# Patient Record
Sex: MALE | Race: WHITE | URBAN - METROPOLITAN AREA
[De-identification: names, ages, dates, MRNs, and addresses within clinical notes are randomized per-mention and may not be internally consistent; named-entity substitution may affect disease eponyms.]

---

## 2017-01-01 ENCOUNTER — OFFICE VISIT (OUTPATIENT)
Dept: PHYSICAL MEDICINE AND REHAB | Facility: CLINIC | Age: 82
End: 2017-01-01

## 2017-01-01 VITALS — SYSTOLIC BLOOD PRESSURE: 156 MMHG | HEIGHT: 66 IN | HEART RATE: 65 BPM | DIASTOLIC BLOOD PRESSURE: 74 MMHG

## 2017-01-01 VITALS — HEART RATE: 75 BPM | HEIGHT: 66 IN | SYSTOLIC BLOOD PRESSURE: 141 MMHG | DIASTOLIC BLOOD PRESSURE: 72 MMHG

## 2017-01-01 DIAGNOSIS — G81.10 SPASTIC HEMIPARESIS AFFECTING DOMINANT SIDE (H): Primary | ICD-10-CM

## 2017-01-01 DIAGNOSIS — G81.11 RIGHT SPASTIC HEMIPARESIS (H): Primary | ICD-10-CM

## 2017-01-01 ASSESSMENT — PAIN SCALES - GENERAL: PAINLEVEL: MODERATE PAIN (5)

## 2017-01-24 NOTE — Clinical Note
1/24/2017       RE: Melody Mckinney  [address]  [facility]  [facility] [address]     Dear Colleague,    Thank you for referring your patient, Melody Mckinney, to the [facility] PHYSICAL MEDICINE AND REHABILITATION at [facility]. Please see a copy of my visit note below.    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    He returns to the clinic today for reinjection of Botox for severe spasticity involving the right UE/LE.   Denies any recent falls.   He is accompanied by his daughter Samra and his daughter.  He denies any pain, but reports increasing tightness.     [place] to be a [place], dependent for his mobility and adl's following a new stroke in Feb 2015 leading to cognitive deficits and aphasia.  He is wheel chair bound.   He continues to be dependent for transfers, using easy stand. He is total assist for adl's.   He stands occasionally perhaps twice a week.   He is incontinent for bowel and bladder, improving with a established bowel program  Appetite is good.      Denies any hospitalizations, or any falls except the incident noted above.        PHYSICAL EXAM  There were no vitals taken for this visit.  Melody is sitting in a wheel; chair, awake and alert.   Speech is slurred, comprehension is impaired   Able to answer yes no questions.   Seated posture is much improved compared to last encounters.   Able to follow directions most of the time  Face is symmetrical   EOM good   He has 0-1/5 strength in the RUE    Antigravity movement in the RLE graded at 3-/5.     Tone   RUE  Pectoralis  2/4  Triceps graded at 4/4 MAS   Biceps 1/4  FDS/FDP 3/4  FCR /FCU are 3/4   RLE   Hip flexion 2/4  Quads are 2/4  Sartorius is 3/4,   Hamstrings are 3/4     RESPONSE TO PREVIOUS  TREATMENT:    Patient Melody Mckinney received 500 units of Botox on 10/25/16.    Problems following the previous series of neurotoxin injections included:  No problems reported    Benefits by Patient Report:    Pain Improvement:  Yes  % Pain Improvement 75 %  Duration of Benefit:  8 weeks.    Spasticity Improvement:  Yes  % Dystonia Improvement 80 %  Duration of Benefit:  9 weeks.    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    His baseline symptoms have been recalcitrant to oral medications and conservative therapy.  He is here today for reinjection with Botox.    GOAL OF PROCEDURE:  The goal of this procedure is to improve increase active range of motion, improve volitional motor control, decrease pain  and enhance functional independence associated with spasticity.    TOTAL DOSE ADMINISTERED:  Dose Administered:  400 units Botox (Botulinum Toxin Type A)       1:1 Dilution     Diluent Used:  Preservative Free Normal Saline  Total Volume of Diluent Used:  4 ml  Lot #  [id number] with Expiration Date:  2019    CONSENT:  The risks, benefits, and treatment options were discussed with Melody Mckinney and he agreed to proceed.    EQUIPMENT USED:  Needle-37mm stimulating/recording  EMG/NCS Machine    SKIN PREPARATION:  Skin preparation was performed using an alcohol wipe.      GUIDANCE DESCRIPTION:  Electro-myographic guidance was necessary throughout the procedure to accurately identify all areas of spastic muscles while  avoiding injection of non-spastic muscles, neighboring nerves and nearby vascular structures.     AREA/MUSCLE INJECTED:  Total dose of the Botox was increased today to 400 units due to increased tone noted in the LE and pectoralis muscles/finger flexors        units  Semimembranous 25 units  Semitendinosus 25 units  Biceps fem 25 units   Iliopsoas 40 units  Sartorious 25 units  Quads x 2 sites of 20 units to a total of 40 units      units  Pects 30 units   Triceps 40 units   FCR/FCU 35/35 units= 70  FDS/FDP 20/20 units= 40  Lumbricals 3 x 10 =  30 units    RESPONSE TO PROCEDURE:  Melody Mckinney tolerated the procedure well and there were no immediate complications.   He was allowed to recover for an appropriate period of time and was discharged home in stable condition.     Follow Up  Melody Mckinney was asked to follow up by phone in 7-14 days with Samra Bridges RN, and Care Coordinator, to report his response to this series of injections.  Based on the patient's previous response to this therapy, Melody Mckinney was rescheduled for the next series of injections in 11 weeks.    Today, the total dose of the Botox was reduced, and focused on the right hand due to hand tightness.     Again, thank you for allowing me to participate in the care of your patient.      Sincerely,    Ellyn Bosch MD

## 2017-01-24 NOTE — PROGRESS NOTES
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    He returns to the clinic today for reinjection of Botox for severe spasticity involving the right UE/LE.   Denies any recent falls.   He is accompanied by his daughter Samra and his daughter.  He denies any pain, but reports increasing tightness.     Melody continues to be a [place], dependent for his mobility and adl's following a new stroke in Feb 2015 leading to cognitive deficits and aphasia.  He is wheel chair bound.   He continues to be dependent for transfers, using easy stand. He is total assist for adl's.   He stands occasionally perhaps twice a week.   He is incontinent for bowel and bladder, improving with a established bowel program  Appetite is good.      Denies any hospitalizations, or any falls except the incident noted above.        PHYSICAL EXAM  There were no vitals taken for this visit.  Melody is sitting in a wheel; chair, awake and alert.   Speech is slurred, comprehension is impaired   Able to answer yes no questions.   Seated posture is much improved compared to last encounters.   Able to follow directions most of the time  Face is symmetrical   EOM good   He has 0-1/5 strength in the RUE    Antigravity movement in the RLE graded at 3-/5.     Tone   RUE  Pectoralis  2/4  Triceps graded at 4/4 MAS   Biceps 1/4  FDS/FDP 3/4  FCR /FCU are 3/4   RLE   Hip flexion 2/4  Quads are 2/4  Sartorius is 3/4,   Hamstrings are 3/4     RESPONSE TO PREVIOUS TREATMENT:    Patient Melody Mckinney received 500 units of Botox on 10/25/16.    Problems following the previous series of neurotoxin injections included:  No problems reported    Benefits by Patient Report:    Pain Improvement:  Yes  % Pain Improvement 75 %  Duration of Benefit:  8 weeks.    Spasticity Improvement:  Yes  % Dystonia Improvement 80  %  Duration of Benefit:  9 weeks.    BOTULINUM NEUROTOXIN INJECTION PROCEDURES:    VERIFICATION OF PATIENT IDENTIFICATION  Responsible Person:  ABDIRASHID  : verified  Full Name: verified    INDICATIONS FOR PROCEDURES:  Melody Mckinney is a 87 year old patient with spasticity affecting the right upper extremity and right lower extremity secondary to a diagnosis of Dominant spastic Stroke with associated pain, spasms, loss of joint motion, loss of volitional motor control, hygiene difficulty, difficulty with activities of daily living and difficulty with ambulation and transfers.    His baseline symptoms have been recalcitrant to oral medications and conservative therapy.  He is here today for reinjection with Botox.    GOAL OF PROCEDURE:  The goal of this procedure is to improve increase active range of motion, improve volitional motor control, decrease pain  and enhance functional independence associated with spasticity.    TOTAL DOSE ADMINISTERED:  Dose Administered:  400 units Botox (Botulinum Toxin Type A)       1:1 Dilution     Diluent Used:  Preservative Free Normal Saline  Total Volume of Diluent Used:  4 ml  Lot #  [id number] with Expiration Date:  2019    CONSENT:  The risks, benefits, and treatment options were discussed with Melody Mckinney and he agreed to proceed.    EQUIPMENT USED:  Needle-37mm stimulating/recording  EMG/NCS Machine    SKIN PREPARATION:  Skin preparation was performed using an alcohol wipe.      GUIDANCE DESCRIPTION:  Electro-myographic guidance was necessary throughout the procedure to accurately identify all areas of spastic muscles while avoiding injection of non-spastic muscles, neighboring nerves and nearby vascular structures.     AREA/MUSCLE INJECTED:  Total dose of the Botox was increased today to 400 units due to increased tone noted in the LE and pectoralis muscles/finger flexors        units  Semimembranous 25 units  Semitendinosus 25 units  Biceps fem 25 units    Iliopsoas 40 units  Sartorious 25 units  Quads x 2 sites of 20 units to a total of 40 units      units  Pects 30 units   Triceps 40 units   FCR/FCU 35/35 units= 70  FDS/FDP 20/20 units= 40  Lumbricals 3 x 10 =  30 units    RESPONSE TO PROCEDURE:  Melody Mckinney tolerated the procedure well and there were no immediate complications.   He was allowed to recover for an appropriate period of time and was discharged home in stable condition.     Follow Up  Melody Mckinney was asked to follow up by phone in 7-14 days with Samra Bridges RN, and Care Coordinator, to report his response to this series of injections.  Based on the patient's previous response to this therapy, Melody Mckinney was rescheduled for the next series of injections in 11 weeks.    Today, the total dose of the Botox was reduced, and focused on the right hand due to hand tightness.

## 2017-04-25 NOTE — MR AVS SNAPSHOT
After Visit Summary   2017    Melody Mckinney    MRN: [id number]           Patient Information     Date Of Birth          1928        Visit Information        Provider Department      2017 1:20 PM Ellyn Bosch MD [facility] Physical Medicine and Rehabilitation        Today's Diagnoses     Right spastic hemiparesis (H)    -  1       Follow-ups after your visit        Follow-up notes from your care team     Return in about 3 months (around 2017).      Your next 10 appointments already scheduled     2017  3:20 PM CDT   (Arrive by 3:05 PM)   Return Visit with Ellyn Bosch MD   [facility] Physical Medicine and Rehabilitation ([facility])    [address] [place] [id number] [phone]              Who to contact     Please call your clinic at [phone] to:    Ask questions about your health    Make or cancel appointments    Discuss your medicines    Learn about your test results    Speak to your doctor   If you have compliments or concerns about an experience at your clinic, or if you wish to file a complaint, please contact [facility] Patient Relations at [phone] or email us at [email or web address]         Additional Information About Your Visit        MyChart Information     [employer] is an electronic gateway that provides easy, online access to your medical records. With [employer], you can request a clinic appointment, read your test results, renew a prescription or communicate with your care team.     To sign up for [employer] visit the website at [email or web address]   You will be asked to enter the access code listed below, as well as some personal information. Please follow the directions to create your username and password.     Your access code is: [email or web address]  Expires: 7/10/2017  6:30 AM     Your access code will  in 90 days. If you need help  "or a new code, please contact your [facility] or call [phone] for assistance.        Care EveryWhere ID     This is your Care EveryWhere ID. This could be used by other organizations to access your [facility] medical records  [id number]        Your Vitals Were     Pulse Height                65 5' 6\" (1.676 m)           Blood Pressure from Last 3 Encounters:   04/25/17 156/74   01/24/17 141/72   10/25/16 (!) 171/103    Weight from Last 3 Encounters:   04/26/16 133 lb (60.3 kg)   08/15/13 135 lb (61.2 kg)   07/19/13 140 lb 8 oz (63.7 kg)              We Performed the Following     H NEEDLE BOTOX     HC CHEMODENERVATION 1 EXTREMITY, 5 OR MORE MUSCLES     HC CHEMODENERVATION 1 EXTREMITY, EA ADDL EXTREMITY, 5 OR MORE MUSCLES     Needle EMG Guide w/Chemodenervation ([id number])        Primary Care Provider Office Phone # Fax #    David Martines [phone]       [address] [address]        Thank you!     Thank you for choosing [facility] PHYSICAL MEDICINE AND REHABILITATION  for your care. Our goal is always to provide you with excellent care. Hearing back from our patients is one way we can continue to improve our services. Please take a few minutes to complete the written survey that you may receive in the mail after your visit with us. Thank you!             Your Updated Medication List - Protect others around you: Learn how to safely use, store and throw away your medicines at www.disposemymeds.org.          This list is accurate as of: 4/25/17  2:37 PM.  Always use your most recent med list.                   Brand Name Dispense Instructions for use    aspirin 81 MG tablet      Take 1 tablet by mouth daily.       baclofen 10 MG tablet    LIORESAL    90 tablet    Take 0.75 tablets (7.5 mg) by mouth 3 times daily       BOTOX IJ      Inject 100 Units as directed /c2; 11/2016       clopidogrel 75 MG tablet    PLAVIX "    90 tablet    Take 1 tablet (75 mg) by mouth daily To reduce risk of stroke       diazepam 5 MG tablet    VALIUM    20 tablet    Take 0.5 tablets (2.5 mg) by mouth every 4 hours as needed for anxiety or sleep       ENSURE Liqd      Take 1 Can by mouth daily.       gabapentin 100 MG capsule    NEURONTIN    90 capsule    Take 1 capsule (100 mg) by mouth 3 times daily       hydrochlorothiazide 25 MG tablet    HYDRODIURIL    90 tablet    Take 1 tablet (25 mg) by mouth daily For hypertension       lisinopril 5 MG tablet    PRINIVIL/ZESTRIL    90 tablet    Take 1 tab daily       metoprolol 50 MG 24 hr tablet    TOPROL-XL    90 tablet    Take 1 pill every morning for heart and blood pressure       nitroglycerin 0.4 MG sublingual tablet    NITROSTAT    25 tablet    Place 1 tablet (0.4 mg) under the tongue every 5 minutes as needed for chest pain If you are still having symptoms after 3 doses (15 minutes) call 911.       nystatin [id number] UNIT/GM Powd    MYCOSTATIN    60 g    To groin BID       order for DME     1 Units    Equipment being ordered: Wheelchair- light weight manual type. Diagnoses are stroke, ischemic heart disease, status post right hip fracture and repair, and gait abnormality.       oxyCODONE 2.5 mg Tabs IR half-tab    ROXICODONE     Take 5 mg by mouth every 4 hours as needed.       sennosides 8.6 MG tablet    SENOKOT    120 tablet    Take 2 tablets by mouth daily as needed for constipation       tiZANidine 2 MG tablet    ZANAFLEX    90 tablet    Take by mouth At Bedtime       traZODone 50 MG tablet    DESYREL     Take  by mouth At Bedtime.       TYLENOL ARTHRITIS PAIN 650 MG CR tablet   Generic drug:  acetaminophen      Take 650 mg by mouth every 8 hours as needed.

## 2017-04-25 NOTE — PROGRESS NOTES
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 His stroke was in Feb 2015 leading to cognitive deficits and aphasia.  He is wheel chair bound since.     He returns to the clinic today for reinjection of Botox for severe spasticity involving the right UE/LE.   Denies any recent falls.   He is accompanied by his daughters Samra.   He denies any pain, but reports increasing tightness.     Melody continues to be a [place], dependent for his mobility and adl's following a new He continues to be dependent for transfers, using easy stand. He is total assist for adl's.     He is incontinent for bowel and bladder, improving with a established bowel program  His daughter reports that he has been wheezing, but no cough. He is on regular diet. His swallow was tested in the past year.     PHYSICAL EXAM  /74  Pulse 65  Ht 5' 6\" (1.676 m)  Melody is sitting in a wheel; chair, awake and alert.   He is much more comfortable   Speech is slurred, comprehension is impaired   Able to answer yes no questions. Not consistent  Able to follow simple directions inconsistently  Paucity  of speech is noted.   Seated posture is much improved compared to last encounters.    Overall tone is improved as is pain   Face is symmetrical   EOM good   He has 0-1/5 strength in the RUE    Antigravity movement in the RLE graded at 3-/5.     Tone   RUE  Pectoralis  1/4  Triceps graded at 4/4 MAS   Biceps 1/4  FDS/FDP 3/4  FCR /FCU are 3/4   RLE   Hip flexion 2/4  Quads are 2/4  Sartorius is 3/4,   Hamstrings are 3/4     RESPONSE TO PREVIOUS TREATMENT:    Patient Melody Mckinney received 400 units of Botox on 1/24/17.    Problems following the previous series of neurotoxin injections included:  No problems reported    Benefits by Patient Report:    Pain Improvement:  Yes  % Pain Improvement " 75 %  Duration of Benefit:  8 weeks.    Spasticity Improvement:  Yes  % Dystonia Improvement 80 %  Duration of Benefit:  9 weeks.    BOTULINUM NEUROTOXIN INJECTION PROCEDURES:    VERIFICATION OF PATIENT IDENTIFICATION  Responsible Person:  ABDIRASHID  : verified  Full Name: verified    INDICATIONS FOR PROCEDURES:  Melody Mckinney is a 87 year old patient with spasticity affecting the right upper extremity and right lower extremity secondary to a diagnosis of Dominant spastic Stroke with associated pain, spasms, loss of joint motion, loss of volitional motor control, hygiene difficulty, difficulty with activities of daily living and difficulty with ambulation and transfers.    His baseline symptoms have been recalcitrant to oral medications and conservative therapy.  He is here today for reinjection with Botox.    GOAL OF PROCEDURE:  The goal of this procedure is to improve increase active range of motion, improve volitional motor control, decrease pain  and enhance functional independence associated with spasticity.    TOTAL DOSE ADMINISTERED:  Dose Administered:  400 units Botox (Botulinum Toxin Type A)       1:1 Dilution     Diluent Used:  Preservative Free Normal Saline  Total Volume of Diluent Used:  4 ml  Lot #   [id number] with Expiration Date:  10/2019    CONSENT:  The risks, benefits, and treatment options were discussed with Melody Mckinney and he agreed to proceed.    EQUIPMENT USED:  Needle-37mm stimulating/recording  EMG/NCS Machine    SKIN PREPARATION:  Skin preparation was performed using an alcohol wipe.      GUIDANCE DESCRIPTION:  Electro-myographic guidance was necessary throughout the procedure to accurately identify all areas of spastic muscles while avoiding injection of non-spastic muscles, neighboring nerves and nearby vascular structures.     AREA/MUSCLE INJECTED:  Total dose of the Botox was increased today to 400 units due to increased tone noted in the LE and pectoralis muscles/finger flexors         units  Semimembranous 25 units  Semitendinosus 25 units  Biceps fem 25 units   Iliopsoas 40 units  Sartorious 25 units  Quads x 3 sites to a total of 40 units      units   Triceps 50 units   FCR/FCU 35/35 units= 70  FDS/FDP 30/30 units= 60  Lumbricals 3 x 10 =  30 units    RESPONSE TO PROCEDURE:  Melody Mckinney tolerated the procedure well and there were no immediate complications.   He was allowed to recover for an appropriate period of time and was discharged home in stable condition.     Follow Up  Melody Mckinney was asked to follow up by phone in 7-14 days with Samra Bridges RN, and Care Coordinator, to report his response to this series of injections.  Based on the patient's previous response to this therapy, Melody Mckinney was rescheduled for the next series of injections in 11 weeks.    Today, the total dose of the Botox was reduced, and focused on the right hand due to hand tightness.

## 2017-04-25 NOTE — LETTER
"4/25/2017       RE: Mleody Mckinney  [address]  [place]  [place] [address]     Dear Colleague,    Thank you for referring your patient, Melody Mckinney, to the [facility] PHYSICAL MEDICINE AND REHABILITATION at [facility]. Please see a copy of my visit note below.    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 His stroke was in Feb 2015 leading to cognitive deficits and aphasia.  He is wheel chair bound since.     He returns to the clinic today for reinjection of Botox for severe spasticity involving the right UE/LE.   Denies any recent falls.   He is accompanied by his daughters Samra.   He denies any pain, but reports increasing tightness.     [place] to be a [place], dependent for his mobility and adl's following a new He continues to be dependent for transfers, using easy stand. He is total assist for adl's.     He is incontinent for bowel and bladder, improving with a established bowel program  His daughter reports that he has been wheezing, but no cough. He is on regular diet. His swallow was tested in the past year.     PHYSICAL EXAM  /74  Pulse 65  Ht 5' 6\" (1.676 m)  Melody is sitting in a wheel; chair, awake and alert.   He is much more comfortable   Speech is slurred, comprehension is impaired   Able to answer yes no questions. Not consistent  Able to follow simple directions inconsistently  Paucity  of speech is noted.   Seated posture is much improved compared to last encounters.    Overall tone is improved as is pain   Face is symmetrical   EOM good   He has 0-1/5 strength in the RUE    Antigravity movement in the RLE graded at 3-/5.     Tone   RUE  Pectoralis  1/4  Triceps graded at 4/4 MAS   Biceps 1/4  FDS/FDP 3/4  FCR /FCU are 3/4   RLE   Hip flexion " 2/4  Quads are 2/4  Sartorius is 3/4,   Hamstrings are 3/4     RESPONSE TO PREVIOUS TREATMENT:    Patient Melody Mckinney received 400 units of Botox on 17.    Problems following the previous series of neurotoxin injections included:  No problems reported    Benefits by Patient Report:    Pain Improvement:  Yes  % Pain Improvement 75 %  Duration of Benefit:  8 weeks.    Spasticity Improvement:  Yes  % Dystonia Improvement 80 %  Duration of Benefit:  9 weeks.    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    His baseline symptoms have been recalcitrant to oral medications and conservative therapy.  He is here today for reinjection with Botox.    GOAL OF PROCEDURE:  The goal of this procedure is to improve increase active range of motion, improve volitional motor control, decrease pain  and enhance functional independence associated with spasticity.    TOTAL DOSE ADMINISTERED:  Dose Administered:  400 units Botox (Botulinum Toxin Type A)       1:1 Dilution     Diluent Used:  Preservative Free Normal Saline  Total Volume of Diluent Used:  4 ml  Lot #   [id number] Expiration Date:  10/2019    CONSENT:  The risks, benefits, and treatment options were discussed with Melody Mckinney and he agreed to proceed.    EQUIPMENT USED:  Needle-37mm stimulating/recording  EMG/NCS Machine    SKIN PREPARATION:  Skin preparation was performed using an alcohol wipe.      GUIDANCE DESCRIPTION:  Electro-myographic guidance was necessary  throughout the procedure to accurately identify all areas of spastic muscles while avoiding injection of non-spastic muscles, neighboring nerves and nearby vascular structures.     AREA/MUSCLE INJECTED:  Total dose of the Botox was increased today to 400 units due to increased tone noted in the LE and pectoralis muscles/finger flexors        units  Semimembranous 25 units  Semitendinosus 25 units  Biceps fem 25 units   Iliopsoas 40 units  Sartorious 25 units  Quads x 3 sites to a total of 40 units      units   Triceps 50 units   FCR/FCU 35/35 units= 70  FDS/FDP 30/30 units= 60  Lumbricals 3 x 10 =  30 units    RESPONSE TO PROCEDURE:  Melody Mckinney tolerated the procedure well and there were no immediate complications.   He was allowed to recover for an appropriate period of time and was discharged home in stable condition.     Follow Up  Melody Mckinney was asked to follow up by phone in 7-14 days with Samra Bridges RN, and Care Coordinator, to report his response to this series of injections.  Based on the patient's previous response to this therapy, Melody Mckinney was rescheduled for the next series of injections in 11 weeks.    Today, the total dose of the Botox was reduced, and focused on the right hand due to hand tightness.       Again, thank you for allowing me to participate in the care of your patient.      Sincerely,    Ellyn Bosch MD